# Patient Record
Sex: MALE | Race: WHITE | NOT HISPANIC OR LATINO | Employment: FULL TIME | URBAN - METROPOLITAN AREA
[De-identification: names, ages, dates, MRNs, and addresses within clinical notes are randomized per-mention and may not be internally consistent; named-entity substitution may affect disease eponyms.]

---

## 2023-12-05 ENCOUNTER — OFFICE VISIT (OUTPATIENT)
Dept: ALLERGY | Facility: CLINIC | Age: 33
End: 2023-12-05
Payer: COMMERCIAL

## 2023-12-05 VITALS
RESPIRATION RATE: 18 BRPM | DIASTOLIC BLOOD PRESSURE: 80 MMHG | HEART RATE: 66 BPM | BODY MASS INDEX: 30.34 KG/M2 | TEMPERATURE: 98 F | OXYGEN SATURATION: 99 % | WEIGHT: 230 LBS | SYSTOLIC BLOOD PRESSURE: 145 MMHG

## 2023-12-05 DIAGNOSIS — J45.30 MILD PERSISTENT ASTHMA WITHOUT COMPLICATION (HHS-HCC): Primary | ICD-10-CM

## 2023-12-05 DIAGNOSIS — J30.9 ALLERGIC RHINITIS, UNSPECIFIED SEASONALITY, UNSPECIFIED TRIGGER: ICD-10-CM

## 2023-12-05 DIAGNOSIS — L20.89 OTHER ATOPIC DERMATITIS: ICD-10-CM

## 2023-12-05 PROCEDURE — 99214 OFFICE O/P EST MOD 30 MIN: CPT | Performed by: ALLERGY & IMMUNOLOGY

## 2023-12-05 RX ORDER — MONTELUKAST SODIUM 10 MG/1
1 TABLET ORAL DAILY
COMMUNITY
Start: 2022-04-29

## 2023-12-05 RX ORDER — DUPILUMAB 300 MG/2ML
300 INJECTION, SOLUTION SUBCUTANEOUS
COMMUNITY
Start: 2020-12-02 | End: 2024-01-03 | Stop reason: SDUPTHER

## 2023-12-05 RX ORDER — FLUTICASONE PROPIONATE 50 MCG
2 SPRAY, SUSPENSION (ML) NASAL DAILY
COMMUNITY

## 2023-12-05 ASSESSMENT — ENCOUNTER SYMPTOMS
CARDIOVASCULAR NEGATIVE: 1
CONSTITUTIONAL NEGATIVE: 1
EYES NEGATIVE: 1
RESPIRATORY NEGATIVE: 1
MUSCULOSKELETAL NEGATIVE: 1
NEUROLOGICAL NEGATIVE: 1
PSYCHIATRIC NEGATIVE: 1
ENDOCRINE NEGATIVE: 1
HEMATOLOGIC/LYMPHATIC NEGATIVE: 1
GASTROINTESTINAL NEGATIVE: 1

## 2023-12-05 NOTE — PROGRESS NOTES
Subjective   Patient ID: Alcides Kilgore is a 33 y.o. male.    Chief Complaint: Follow up asthma and allergy  History of allergy skin test 2018.  Grass, ragweed, DM, Alternaria.  Cetirizine daily and and Fluticasone.  Asthma-No issues with this now. Has not needed albuterol.  History of only mild asthma.  On Singulair every day.  Atopic dermatitis-Dupixent.  Has not been able to afford as the [employer] program did not work for him because it went toward his deductible-had exhausted his [employer] benefit for the year.  Skin is mostly clear.  Small of back and hands have been trouble spots and these are controlled on Dupixent    No infections, no joint pain, no issues at shot site.  No eye issues.  [facility].  Patient would like to continue with current therapies due to excellent control and lack of side effects.    Review of Systems   Constitutional: Negative.    HENT: Negative.     Eyes: Negative.    Respiratory: Negative.     Cardiovascular: Negative.    Gastrointestinal: Negative.    Endocrine: Negative.    Genitourinary: Negative.    Musculoskeletal: Negative.    Skin:         Dry skin, mild itching   Neurological: Negative.    Hematological: Negative.    Psychiatric/Behavioral: Negative.       Objective   Physical Exam  Vitals and nursing note reviewed.   Constitutional:       Appearance: Normal appearance.   HENT:      Right Ear: Tympanic membrane normal.      Left Ear: Tympanic membrane normal.      Nose: Nose normal.      Mouth/Throat:      Mouth: Mucous membranes are moist.   Eyes:      Conjunctiva/sclera: Conjunctivae normal.      Pupils: Pupils are equal, round, and reactive to light.   Cardiovascular:      Rate and Rhythm: Normal rate and regular rhythm.      Heart sounds: Normal heart sounds.   Pulmonary:      Effort: Pulmonary effort is normal.      Breath sounds: Normal breath sounds.   Abdominal:      General: Bowel sounds are normal.      Palpations: Abdomen is soft.   Musculoskeletal:         General:  Normal range of motion.   Skin:     General: Skin is warm and dry.      Capillary Refill: Capillary refill takes less than 2 seconds.      Comments: Mild dryness and hyperlinearity on the palms.  Dry patch on the low back pale pink.  No scratch marks.   Neurological:      General: No focal deficit present.      Mental Status: He is alert and oriented to person, place, and time.   Psychiatric:         Mood and Affect: Mood normal.     Assessment/Plan    Alcides is a 32 yo man with allergy to grass, ragweed, dust mite and Alternaria mold, mild asthma, history of severe atopic dermatitis. He is controlled on current therapies without side effects. He would like to continue with this plan.  - Review of avoidance measures, medications and skin care plan.  -Continue with Dupixent for ATOPIC DERMATITIS.  -Singulair only right now for asthma is controlling symptoms.    Follow up is planned in 6 months.

## 2024-01-03 DIAGNOSIS — L20.9 ATOPIC DERMATITIS, UNSPECIFIED TYPE: Primary | ICD-10-CM

## 2024-01-03 RX ORDER — DUPILUMAB 300 MG/2ML
300 INJECTION, SOLUTION SUBCUTANEOUS
Qty: 2 ML | Refills: 11 | Status: SHIPPED | OUTPATIENT
Start: 2024-01-03

## 2024-04-04 ENCOUNTER — OFFICE VISIT (OUTPATIENT)
Dept: PRIMARY CARE | Facility: CLINIC | Age: 34
End: 2024-04-04
Payer: COMMERCIAL

## 2024-04-04 VITALS
TEMPERATURE: 98.1 F | OXYGEN SATURATION: 98 % | SYSTOLIC BLOOD PRESSURE: 137 MMHG | RESPIRATION RATE: 18 BRPM | DIASTOLIC BLOOD PRESSURE: 83 MMHG | HEART RATE: 63 BPM | BODY MASS INDEX: 29.95 KG/M2 | WEIGHT: 226 LBS | HEIGHT: 73 IN

## 2024-04-04 DIAGNOSIS — J20.9 ACUTE BRONCHITIS, UNSPECIFIED ORGANISM: Primary | ICD-10-CM

## 2024-04-04 DIAGNOSIS — J45.30 MILD PERSISTENT ASTHMA WITHOUT COMPLICATION (HHS-HCC): ICD-10-CM

## 2024-04-04 PROCEDURE — 99213 OFFICE O/P EST LOW 20 MIN: CPT | Performed by: FAMILY MEDICINE

## 2024-04-04 RX ORDER — METHYLPREDNISOLONE 4 MG/1
TABLET ORAL
Qty: 21 TABLET | Refills: 0 | Status: SHIPPED | OUTPATIENT
Start: 2024-04-04 | End: 2024-04-04 | Stop reason: SDUPTHER

## 2024-04-04 RX ORDER — METHYLPREDNISOLONE 4 MG/1
TABLET ORAL
Qty: 21 TABLET | Refills: 0 | Status: SHIPPED | OUTPATIENT
Start: 2024-04-04 | End: 2024-04-11

## 2024-04-04 RX ORDER — AZITHROMYCIN 250 MG/1
TABLET, FILM COATED ORAL
Qty: 6 TABLET | Refills: 0 | Status: SHIPPED | OUTPATIENT
Start: 2024-04-04 | End: 2024-04-09

## 2024-04-04 RX ORDER — AZITHROMYCIN 250 MG/1
TABLET, FILM COATED ORAL
Qty: 6 TABLET | Refills: 0 | Status: SHIPPED | OUTPATIENT
Start: 2024-04-04 | End: 2024-04-04 | Stop reason: SDUPTHER

## 2024-04-04 ASSESSMENT — ENCOUNTER SYMPTOMS
HEADACHES: 0
SHORTNESS OF BREATH: 0
COUGH: 1

## 2024-04-04 NOTE — PROGRESS NOTES
"Subjective     Alcides Kilgore is a 33 y.o. male who presents for Cough (Going on a month. ).    Cough  Pertinent negatives include no chest pain, headaches or shortness of breath.        Pt reports cough, mostly dry until this week. Now productive  cough present for 1 month. Cough is worsening.  No fevers.  No chills.  Sore throat.  Rhinorrhea, uses flonase.  Hx of asthma, controlled , sees allergist, on dupixent, zyrtec, singulair.  No wheezing.  No shortness of breath.  He has been using dayquil cough syrup.      Review of Systems   Respiratory:  Positive for cough. Negative for shortness of breath.    Cardiovascular:  Negative for chest pain.   Neurological:  Negative for headaches.       Objective     Vitals:    04/04/24 1557   BP: 137/83   BP Location: Left arm   Patient Position: Sitting   Pulse: 63   Resp: 18   Temp: 36.7 °C (98.1 °F)   TempSrc: Temporal   SpO2: 98%   Weight: 103 kg (226 lb)   Height: 1.854 m (6' 1\")        Current Outpatient Medications   Medication Instructions    azithromycin (Zithromax) 250 mg tablet Take 2 tablets (500 mg) by mouth once daily for 1 day, THEN 1 tablet (250 mg) once daily for 4 days. Take 2 tabs (500 mg) by mouth today, than 1 daily for 4 days..    cetirizine (ZYRTEC) 10 mg, oral, Daily    Dupixent Syringe 300 mg, subcutaneous, Every 14 days    fluticasone (Flonase Allergy Relief) 50 mcg/actuation nasal spray 2 sprays, Each Nostril, Daily    methylPREDNISolone (Medrol Dospak) 4 mg tablets Take as directed on package.    montelukast (Singulair) 10 mg tablet 1 tablet, oral, Daily        No Known Allergies     Past Surgical History:   Procedure Laterality Date    OTHER SURGICAL HISTORY  06/07/2019    Colonoscopy    OTHER SURGICAL HISTORY  03/13/2020    Endoscopy        Social History     Tobacco Use    Smoking status: Never    Smokeless tobacco: Never   Vaping Use    Vaping Use: Never used        Social History     Substance and Sexual Activity   Alcohol Use None       No " family history on file.     Immunization History   Administered Date(s) Administered    Pfizer Purple Cap SARS-CoV-2 07/30/2021, 08/20/2021        Physical Exam  Constitutional:       General: He is not in acute distress.     Appearance: He is not toxic-appearing.   HENT:      Head: Normocephalic and atraumatic.      Right Ear: Tympanic membrane, ear canal and external ear normal.      Left Ear: Tympanic membrane, ear canal and external ear normal.      Nose: No congestion or rhinorrhea.      Mouth/Throat:      Mouth: Mucous membranes are moist.      Pharynx: Oropharynx is clear. No oropharyngeal exudate or posterior oropharyngeal erythema.   Eyes:      Conjunctiva/sclera: Conjunctivae normal.   Cardiovascular:      Rate and Rhythm: Normal rate and regular rhythm.   Pulmonary:      Effort: Pulmonary effort is normal. No respiratory distress.      Breath sounds: Normal breath sounds. No wheezing, rhonchi or rales.   Lymphadenopathy:      Cervical: No cervical adenopathy.   Skin:     General: Skin is warm and dry.      Findings: No rash.   Neurological:      Mental Status: He is alert and oriented to person, place, and time. Mental status is at baseline.   Psychiatric:         Mood and Affect: Mood normal.         Behavior: Behavior normal.         Problem List Items Addressed This Visit       Mild persistent asthma without complication     Stable  On singulair, zyrtec, dupixent  Sees allergist          Other Visit Diagnoses       Acute bronchitis, unspecified organism    -  Primary    Relevant Medications    azithromycin (Zithromax) 250 mg tablet    methylPREDNISolone (Medrol Dospak) 4 mg tablets            Assessment/Plan     Acute bronchitis - treat with medrol, zpak, Follow up if no improvement or if symptoms worsen.  Proceed to the nearest emergency department if condition worsens significantly/becomes severe in nature.

## 2024-04-22 ENCOUNTER — TELEPHONE (OUTPATIENT)
Dept: PRIMARY CARE | Facility: CLINIC | Age: 34
End: 2024-04-22
Payer: COMMERCIAL

## 2024-04-22 NOTE — TELEPHONE ENCOUNTER
Per pt, he is done with the steroid and the Zpack but he is still coughing. The cough is mostly productive. He has some SOB with exertion. He is asking for advise.

## 2024-04-24 ENCOUNTER — OFFICE VISIT (OUTPATIENT)
Dept: PRIMARY CARE | Facility: CLINIC | Age: 34
End: 2024-04-24
Payer: COMMERCIAL

## 2024-04-24 VITALS
DIASTOLIC BLOOD PRESSURE: 83 MMHG | TEMPERATURE: 97.7 F | HEIGHT: 73 IN | HEART RATE: 68 BPM | OXYGEN SATURATION: 98 % | SYSTOLIC BLOOD PRESSURE: 135 MMHG | BODY MASS INDEX: 29.5 KG/M2 | RESPIRATION RATE: 18 BRPM | WEIGHT: 222.6 LBS

## 2024-04-24 DIAGNOSIS — R05.3 COUGH, PERSISTENT: Primary | ICD-10-CM

## 2024-04-24 DIAGNOSIS — J40 BRONCHITIS: ICD-10-CM

## 2024-04-24 PROCEDURE — 99213 OFFICE O/P EST LOW 20 MIN: CPT | Performed by: FAMILY MEDICINE

## 2024-04-24 RX ORDER — DOXYCYCLINE 100 MG/1
100 CAPSULE ORAL 2 TIMES DAILY
Qty: 14 CAPSULE | Refills: 0 | Status: SHIPPED | OUTPATIENT
Start: 2024-04-24 | End: 2024-05-01

## 2024-04-24 RX ORDER — PREDNISONE 50 MG/1
50 TABLET ORAL DAILY
Qty: 5 TABLET | Refills: 0 | Status: SHIPPED | OUTPATIENT
Start: 2024-04-24 | End: 2024-04-24

## 2024-04-24 RX ORDER — DOXYCYCLINE 100 MG/1
100 CAPSULE ORAL 2 TIMES DAILY
Qty: 14 CAPSULE | Refills: 0 | Status: SHIPPED | OUTPATIENT
Start: 2024-04-24 | End: 2024-04-24

## 2024-04-24 RX ORDER — BENZONATATE 200 MG/1
200 CAPSULE ORAL 3 TIMES DAILY PRN
Qty: 21 CAPSULE | Refills: 0 | Status: SHIPPED | OUTPATIENT
Start: 2024-04-24 | End: 2024-04-24

## 2024-04-24 RX ORDER — PREDNISONE 50 MG/1
50 TABLET ORAL DAILY
Qty: 5 TABLET | Refills: 0 | Status: SHIPPED | OUTPATIENT
Start: 2024-04-24 | End: 2024-04-29

## 2024-04-24 RX ORDER — OMEPRAZOLE 20 MG/1
20 TABLET, DELAYED RELEASE ORAL
COMMUNITY

## 2024-04-24 RX ORDER — BENZONATATE 200 MG/1
200 CAPSULE ORAL 3 TIMES DAILY PRN
Qty: 21 CAPSULE | Refills: 0 | Status: SHIPPED | OUTPATIENT
Start: 2024-04-24 | End: 2024-05-01

## 2024-04-24 NOTE — PROGRESS NOTES
"Subjective     Alcides Kilgore is a 33 y.o. male who presents for bronchitis follow up (Per patient, cough is still going strong, patient is unable to do any extraneous exercises. ).    HPI     Pt was seen on 4/4 for persistent cough/bronchitis - treated with medrol and zpak which helped a small amount however he continues to cough.  No fevers.  He sometimes also gets shortness of breath.  Hx of childhood asthma.  Hx of seasonal allergies. He takes zyrtec, singulair.  He has seen  allergist, last in Dec. 2023.  He started omeprazole one week ago to see if that would help with his cough which it has not.      Review of Systems   Constitutional:  Negative for fever.       Objective     Vitals:    04/24/24 1609   BP: 135/83   BP Location: Left arm   Patient Position: Sitting   Pulse: 68   Resp: 18   Temp: 36.5 °C (97.7 °F)   TempSrc: Temporal   SpO2: 98%   Weight: 101 kg (222 lb 9.6 oz)   Height: 1.854 m (6' 1\")        Current Outpatient Medications   Medication Instructions    benzonatate (TESSALON) 200 mg, oral, 3 times daily PRN, Do not crush or chew.    cetirizine (ZYRTEC) 10 mg, oral, Daily    doxycycline (VIBRAMYCIN) 100 mg, oral, 2 times daily, Take with at least 8 ounces (large glass) of water, do not lie down for 30 minutes after    Dupixent Syringe 300 mg, subcutaneous, Every 14 days    fluticasone (Flonase Allergy Relief) 50 mcg/actuation nasal spray 2 sprays, Each Nostril, Daily    montelukast (Singulair) 10 mg tablet 1 tablet, oral, Daily    omeprazole OTC (PRILOSEC OTC) 20 mg, oral, Daily before breakfast, Do not crush, chew, or split.    predniSONE (DELTASONE) 50 mg, oral, Daily        No Known Allergies     Past Surgical History:   Procedure Laterality Date    OTHER SURGICAL HISTORY  06/07/2019    Colonoscopy    OTHER SURGICAL HISTORY  03/13/2020    Endoscopy        Social History     Tobacco Use    Smoking status: Never    Smokeless tobacco: Never   Vaping Use    Vaping status: Never Used    "     Social History     Substance and Sexual Activity   Alcohol Use None       No family history on file.     Immunization History   Administered Date(s) Administered    Pfizer Purple Cap SARS-CoV-2 07/30/2021, 08/20/2021        Physical Exam  Vitals reviewed.   Constitutional:       General: He is not in acute distress.     Appearance: Normal appearance. He is well-developed.   HENT:      Head: Normocephalic and atraumatic.   Eyes:      General: Lids are normal.      Conjunctiva/sclera:      Right eye: Right conjunctiva is not injected.      Left eye: Left conjunctiva is not injected.   Cardiovascular:      Rate and Rhythm: Normal rate and regular rhythm.      Heart sounds: No murmur heard.  Pulmonary:      Effort: Pulmonary effort is normal. No respiratory distress.      Breath sounds: No wheezing, rhonchi or rales.      Comments: Slightly coarse breath sounds.   Skin:     General: Skin is warm and dry.      Findings: No rash.   Neurological:      Mental Status: He is alert and oriented to person, place, and time. Mental status is at baseline.   Psychiatric:         Mood and Affect: Mood normal.         Behavior: Behavior normal.         Problem List Items Addressed This Visit    None  Visit Diagnoses       Cough, persistent    -  Primary    Relevant Medications    benzonatate (Tessalon) 200 mg capsule    doxycycline (Vibramycin) 100 mg capsule    predniSONE (Deltasone) 50 mg tablet    Other Relevant Orders    XR chest 2 views    Bronchitis        Relevant Medications    benzonatate (Tessalon) 200 mg capsule    doxycycline (Vibramycin) 100 mg capsule    predniSONE (Deltasone) 50 mg tablet    Other Relevant Orders    XR chest 2 views            Assessment/Plan     Cough , persistent - possible asthma vs post infectious bronchitis - treat with prednisone 50 mg daily x 5 days, doxycycline 100 mg BID x 7 days, obtain CXR and follow up with allergist to get evaluated for asthma with PFTs.  Pt agreeable.  Follow up if no  improvement or if symptoms worsen.  Proceed to the nearest emergency department if condition worsens significantly/becomes severe in nature.

## 2024-04-25 ENCOUNTER — HOSPITAL ENCOUNTER (OUTPATIENT)
Dept: RADIOLOGY | Facility: HOSPITAL | Age: 34
Discharge: HOME | End: 2024-04-25
Payer: COMMERCIAL

## 2024-04-25 DIAGNOSIS — R05.3 COUGH, PERSISTENT: ICD-10-CM

## 2024-04-25 DIAGNOSIS — J40 BRONCHITIS: ICD-10-CM

## 2024-04-25 PROCEDURE — 71046 X-RAY EXAM CHEST 2 VIEWS: CPT

## 2024-04-25 PROCEDURE — 71046 X-RAY EXAM CHEST 2 VIEWS: CPT | Performed by: RADIOLOGY

## 2024-04-25 ASSESSMENT — ENCOUNTER SYMPTOMS: FEVER: 0

## 2024-05-17 ENCOUNTER — OFFICE VISIT (OUTPATIENT)
Dept: PRIMARY CARE | Facility: CLINIC | Age: 34
End: 2024-05-17
Payer: COMMERCIAL

## 2024-05-17 VITALS
RESPIRATION RATE: 18 BRPM | TEMPERATURE: 97.3 F | SYSTOLIC BLOOD PRESSURE: 128 MMHG | HEART RATE: 67 BPM | HEIGHT: 73 IN | WEIGHT: 228 LBS | DIASTOLIC BLOOD PRESSURE: 83 MMHG | OXYGEN SATURATION: 98 % | BODY MASS INDEX: 30.22 KG/M2

## 2024-05-17 DIAGNOSIS — J45.30 MILD PERSISTENT ASTHMA WITHOUT COMPLICATION (HHS-HCC): ICD-10-CM

## 2024-05-17 DIAGNOSIS — Z00.00 HEALTH CARE MAINTENANCE: Primary | ICD-10-CM

## 2024-05-17 DIAGNOSIS — J30.9 ALLERGIC RHINITIS, UNSPECIFIED SEASONALITY, UNSPECIFIED TRIGGER: ICD-10-CM

## 2024-05-17 PROBLEM — E78.1 HYPERTRIGLYCERIDEMIA: Status: ACTIVE | Noted: 2024-05-17

## 2024-05-17 LAB
POC APPEARANCE, URINE: CLEAR
POC BILIRUBIN, URINE: NEGATIVE
POC BLOOD, URINE: NEGATIVE
POC COLOR, URINE: YELLOW
POC GLUCOSE, URINE: NEGATIVE MG/DL
POC KETONES, URINE: NEGATIVE MG/DL
POC LEUKOCYTES, URINE: NEGATIVE
POC NITRITE,URINE: NEGATIVE
POC PH, URINE: 6.5 PH
POC PROTEIN, URINE: NEGATIVE MG/DL
POC SPECIFIC GRAVITY, URINE: 1.01
POC UROBILINOGEN, URINE: 0.2 EU/DL

## 2024-05-17 PROCEDURE — 90715 TDAP VACCINE 7 YRS/> IM: CPT | Performed by: FAMILY MEDICINE

## 2024-05-17 PROCEDURE — 99395 PREV VISIT EST AGE 18-39: CPT | Performed by: FAMILY MEDICINE

## 2024-05-17 PROCEDURE — 81002 URINALYSIS NONAUTO W/O SCOPE: CPT | Performed by: FAMILY MEDICINE

## 2024-05-17 PROCEDURE — 1036F TOBACCO NON-USER: CPT | Performed by: FAMILY MEDICINE

## 2024-05-17 PROCEDURE — 90471 IMMUNIZATION ADMIN: CPT | Performed by: FAMILY MEDICINE

## 2024-05-17 ASSESSMENT — PATIENT HEALTH QUESTIONNAIRE - PHQ9
1. LITTLE INTEREST OR PLEASURE IN DOING THINGS: NOT AT ALL
SUM OF ALL RESPONSES TO PHQ9 QUESTIONS 1 AND 2: 0
2. FEELING DOWN, DEPRESSED OR HOPELESS: NOT AT ALL

## 2024-05-17 ASSESSMENT — ENCOUNTER SYMPTOMS
HEADACHES: 0
SHORTNESS OF BREATH: 0

## 2024-05-17 NOTE — PROGRESS NOTES
"Subjective     Alcides Kilgore is a 33 y.o. male who presents for Annual Exam.    HPI     Pt is here today for annual well exam.  Pt feels well.     He sees allergist , on dupixent for atopic derm, hx of asthma.  He also takes singulair and zyrtec daily.      His cough has improved since his last visit with me on 4/24/24. He still has cough but will follow up with his allergist.  He reports the flonase 2 puffs daily has helped.      Review of Systems   Respiratory:  Negative for shortness of breath.    Cardiovascular:  Negative for chest pain.   Neurological:  Negative for headaches.       Objective     Vitals:    05/17/24 1000   BP: 128/83   BP Location: Left arm   Patient Position: Sitting   Pulse: 67   Resp: 18   Temp: 36.3 °C (97.3 °F)   TempSrc: Temporal   SpO2: 98%   Weight: 103 kg (228 lb)   Height: 1.854 m (6' 1\")        Current Outpatient Medications   Medication Instructions    cetirizine (ZYRTEC) 10 mg, oral, Daily    Dupixent Syringe 300 mg, subcutaneous, Every 14 days    fluticasone (Flonase Allergy Relief) 50 mcg/actuation nasal spray 2 sprays, Each Nostril, Daily    montelukast (Singulair) 10 mg tablet 1 tablet, oral, Daily    omeprazole OTC (PRILOSEC OTC) 20 mg, oral, Daily before breakfast, Do not crush, chew, or split.        No Known Allergies     Past Surgical History:   Procedure Laterality Date    OTHER SURGICAL HISTORY  06/07/2019    Colonoscopy    OTHER SURGICAL HISTORY  03/13/2020    Endoscopy        Social History     Tobacco Use    Smoking status: Never    Smokeless tobacco: Never   Vaping Use    Vaping status: Never Used        Social History     Substance and Sexual Activity   Alcohol Use None       No family history on file.     Immunization History   Administered Date(s) Administered    DTaP, Unspecified 1989, 1989, 02/05/1990, 08/07/1991, 08/22/1994    Flu vaccine (IIV4), preservative free *Check age/dose* 09/11/2018, 12/07/2021    Hepatitis B vaccine, " pediatric/adolescent (RECOMBIVAX, ENGERIX) 04/07/1998, 06/10/1998, 12/01/1998    HiB, unspecified 10/19/1990    Influenza, Unspecified 10/19/2016, 10/06/2017    Influenza, seasonal, injectable 10/01/2017    MMR vaccine, subcutaneous (MMR II) 12/27/1990, 08/16/2001    Meningococcal ACWY-D (Menactra) 4-valent conjugate vaccine 05/14/2007    PPD Test 07/05/2016, 07/13/2016, 10/18/2017, 08/08/2018    Pfizer Purple Cap SARS-CoV-2 07/30/2021, 08/20/2021    Polio, Unspecified 1989, 1989, 08/07/1991, 08/22/1994    Tdap vaccine, age 7 year and older (BOOSTRIX, ADACEL) 01/08/2015, 05/17/2024    Varicella vaccine, subcutaneous (VARIVAX) 08/16/2001        Physical Exam  Vitals reviewed.   Constitutional:       General: He is not in acute distress.     Appearance: Normal appearance.   HENT:      Head: Normocephalic and atraumatic.      Right Ear: Tympanic membrane, ear canal and external ear normal.      Left Ear: Tympanic membrane, ear canal and external ear normal.      Nose: Nose normal.      Mouth/Throat:      Mouth: Mucous membranes are moist.      Pharynx: Oropharynx is clear. No oropharyngeal exudate or posterior oropharyngeal erythema.   Eyes:      Extraocular Movements: Extraocular movements intact.      Pupils: Pupils are equal, round, and reactive to light.   Neck:      Thyroid: No thyroid mass or thyromegaly.   Cardiovascular:      Rate and Rhythm: Normal rate and regular rhythm.      Heart sounds: No murmur heard.  Pulmonary:      Effort: Pulmonary effort is normal. No respiratory distress.      Breath sounds: Normal breath sounds. No wheezing, rhonchi or rales.   Abdominal:      General: Abdomen is flat. There is no distension.      Palpations: Abdomen is soft.      Tenderness: There is no abdominal tenderness.   Genitourinary:     Testes: Normal.   Musculoskeletal:         General: Normal range of motion.   Lymphadenopathy:      Cervical: No cervical adenopathy.   Skin:     General: Skin is warm and  dry.      Findings: No rash.   Neurological:      General: No focal deficit present.      Mental Status: He is alert and oriented to person, place, and time. Mental status is at baseline.   Psychiatric:         Mood and Affect: Mood normal.         Behavior: Behavior normal.         Problem List Items Addressed This Visit       Mild persistent asthma without complication ([facility])    Allergic rhinitis     Other Visit Diagnoses       Health care maintenance    -  Primary    Relevant Orders    POCT UA (nonautomated) manually resulted (Completed)    Comprehensive Metabolic Panel    Lipid Panel    CBC and Auto Differential    Hemoglobin A1C    Tdap vaccine, age 7 years and older (Completed)            Assessment/Plan     Well Exam     Vaccines - tdap due - given today     I recommend yearly flu vaccine and routine COVID vaccinations as indicated     Complete labs    BMI 30 - Healthy diet, routine exercise was discussed with patient  - declined nutrition referral    Atopic derm, allergic rhinitis , hx of asthma - sees  allergist.     Follow up in 1 year or sooner if needed

## 2024-05-23 ENCOUNTER — LAB (OUTPATIENT)
Dept: LAB | Facility: LAB | Age: 34
End: 2024-05-23
Payer: COMMERCIAL

## 2024-05-23 DIAGNOSIS — Z00.00 HEALTH CARE MAINTENANCE: ICD-10-CM

## 2024-05-23 LAB
ALBUMIN SERPL BCP-MCNC: 5.1 G/DL (ref 3.4–5)
ALP SERPL-CCNC: 53 U/L (ref 33–120)
ALT SERPL W P-5'-P-CCNC: 41 U/L (ref 10–52)
ANION GAP SERPL CALC-SCNC: 12 MMOL/L (ref 10–20)
AST SERPL W P-5'-P-CCNC: 25 U/L (ref 9–39)
BASOPHILS # BLD AUTO: 0.04 X10*3/UL (ref 0–0.1)
BASOPHILS NFR BLD AUTO: 0.5 %
BILIRUB SERPL-MCNC: 1 MG/DL (ref 0–1.2)
BUN SERPL-MCNC: 21 MG/DL (ref 6–23)
CALCIUM SERPL-MCNC: 10 MG/DL (ref 8.6–10.3)
CHLORIDE SERPL-SCNC: 104 MMOL/L (ref 98–107)
CHOLEST SERPL-MCNC: 206 MG/DL (ref 0–199)
CHOLESTEROL/HDL RATIO: 4.5
CO2 SERPL-SCNC: 29 MMOL/L (ref 21–32)
CREAT SERPL-MCNC: 1.06 MG/DL (ref 0.5–1.3)
EGFRCR SERPLBLD CKD-EPI 2021: >90 ML/MIN/1.73M*2
EOSINOPHIL # BLD AUTO: 0.23 X10*3/UL (ref 0–0.7)
EOSINOPHIL NFR BLD AUTO: 2.9 %
ERYTHROCYTE [DISTWIDTH] IN BLOOD BY AUTOMATED COUNT: 13.2 % (ref 11.5–14.5)
EST. AVERAGE GLUCOSE BLD GHB EST-MCNC: 105 MG/DL
GLUCOSE SERPL-MCNC: 100 MG/DL (ref 74–99)
HBA1C MFR BLD: 5.3 %
HCT VFR BLD AUTO: 46.7 % (ref 41–52)
HDLC SERPL-MCNC: 45.7 MG/DL
HGB BLD-MCNC: 15.9 G/DL (ref 13.5–17.5)
IMM GRANULOCYTES # BLD AUTO: 0.02 X10*3/UL (ref 0–0.7)
IMM GRANULOCYTES NFR BLD AUTO: 0.3 % (ref 0–0.9)
LDLC SERPL CALC-MCNC: 137 MG/DL
LYMPHOCYTES # BLD AUTO: 3.23 X10*3/UL (ref 1.2–4.8)
LYMPHOCYTES NFR BLD AUTO: 40.7 %
MCH RBC QN AUTO: 30.2 PG (ref 26–34)
MCHC RBC AUTO-ENTMCNC: 34 G/DL (ref 32–36)
MCV RBC AUTO: 89 FL (ref 80–100)
MONOCYTES # BLD AUTO: 0.43 X10*3/UL (ref 0.1–1)
MONOCYTES NFR BLD AUTO: 5.4 %
NEUTROPHILS # BLD AUTO: 3.99 X10*3/UL (ref 1.2–7.7)
NEUTROPHILS NFR BLD AUTO: 50.2 %
NON HDL CHOLESTEROL: 160 MG/DL (ref 0–149)
NRBC BLD-RTO: 0 /100 WBCS (ref 0–0)
PLATELET # BLD AUTO: 218 X10*3/UL (ref 150–450)
POTASSIUM SERPL-SCNC: 4.3 MMOL/L (ref 3.5–5.3)
PROT SERPL-MCNC: 7.2 G/DL (ref 6.4–8.2)
RBC # BLD AUTO: 5.26 X10*6/UL (ref 4.5–5.9)
SODIUM SERPL-SCNC: 141 MMOL/L (ref 136–145)
TRIGL SERPL-MCNC: 117 MG/DL (ref 0–149)
VLDL: 23 MG/DL (ref 0–40)
WBC # BLD AUTO: 7.9 X10*3/UL (ref 4.4–11.3)

## 2024-05-23 PROCEDURE — 83036 HEMOGLOBIN GLYCOSYLATED A1C: CPT

## 2024-05-23 PROCEDURE — 36415 COLL VENOUS BLD VENIPUNCTURE: CPT

## 2024-05-23 PROCEDURE — 80053 COMPREHEN METABOLIC PANEL: CPT

## 2024-05-23 PROCEDURE — 80061 LIPID PANEL: CPT

## 2024-05-23 PROCEDURE — 85025 COMPLETE CBC W/AUTO DIFF WBC: CPT

## 2024-05-27 DIAGNOSIS — E78.5 HYPERLIPIDEMIA, MILD: Primary | ICD-10-CM

## 2024-06-04 ENCOUNTER — OFFICE VISIT (OUTPATIENT)
Dept: ALLERGY | Facility: CLINIC | Age: 34
End: 2024-06-04
Payer: COMMERCIAL

## 2024-06-04 VITALS
OXYGEN SATURATION: 98 % | SYSTOLIC BLOOD PRESSURE: 124 MMHG | HEIGHT: 73 IN | RESPIRATION RATE: 17 BRPM | WEIGHT: 225 LBS | TEMPERATURE: 98.7 F | BODY MASS INDEX: 29.82 KG/M2 | DIASTOLIC BLOOD PRESSURE: 80 MMHG | HEART RATE: 70 BPM

## 2024-06-04 DIAGNOSIS — J45.20 MILD INTERMITTENT ASTHMA WITHOUT COMPLICATION (HHS-HCC): ICD-10-CM

## 2024-06-04 DIAGNOSIS — L20.89 OTHER ATOPIC DERMATITIS: Primary | ICD-10-CM

## 2024-06-04 DIAGNOSIS — J30.1 SEASONAL ALLERGIC RHINITIS DUE TO POLLEN: ICD-10-CM

## 2024-06-04 PROCEDURE — 99214 OFFICE O/P EST MOD 30 MIN: CPT | Performed by: ALLERGY & IMMUNOLOGY

## 2024-06-04 NOTE — PROGRESS NOTES
Patient ID: Alcides Kilgore is a 33 y.o. male.     Chief Complaint: follow up  History Of Present Illness  Alcides Kilgore is a 33 y.o. male with PMx atopic dermatitis, allergic rhinitis, intermittent asthma presenting for follow up.         Food Allergy  No    Eczema/ Atopic Dermatitis  Dupixent-doing well. Extremely mild flare on the right hand.  Would like to stay on.  Has not had any side effects.  Self injecting at home.  [facility]  Using moisturizers and has not needed steroids    Asthma  History of mild intermittent asthma  Singulair daily  Albuterol-does not have.    Rhinoconjunctivitis  Grass, ragweed, DM and Alternaria-cetirizine and Flonase  Last month had increased symptoms and needed antibiotics and steroid    Drug Allergy   No    Insect Allergy   No    Infections  No history of frequent or recurrent infections      Review of Systems    Pertinent positives and negatives have been assessed in the HPI. All other systems have been reviewed and are negative except as noted in the HPI.    Allergies  Patient has no known allergies.    Past Medical History  He has a past medical history of Other seasonal allergic rhinitis.    Family History  No family history on file.    No history of food allergy or atopic disease in the family.    Surgical History  He has a past surgical history that includes Other surgical history (06/07/2019) and Other surgical history (03/13/2020).    Social/Environmental History  He reports that he has never smoked. He has never used smokeless tobacco. No history on file for alcohol use and drug use.    Home: Lives in a house - new purchase [facility]  Floors: Wood, carpet  Air Conditioning: Central  Smoker: No  Pets: 4 cats  Infestations: No  Molds: No  Occupation: works from home    MEDICATIONS  Current Outpatient Medications on File Prior to Visit   Medication Sig Dispense Refill    cetirizine (ZyrTEC) 2.5 MG split tablet Take 4 half tablet (10 mg) by mouth once daily.       "Dupixent Syringe 300 mg/2 mL syringe injection Inject 1 Syringe (300 mg) under the skin every 14 (fourteen) days. 2 mL 11    fluticasone (Flonase Allergy Relief) 50 mcg/actuation nasal spray Administer 2 sprays into each nostril once daily.      montelukast (Singulair) 10 mg tablet Take 1 tablet (10 mg) by mouth once daily.      omeprazole OTC (PriLOSEC OTC) 20 mg EC tablet Take 1 tablet (20 mg) by mouth once daily in the morning. Take before meals. Do not crush, chew, or split.       No current facility-administered medications on file prior to visit.         Physical Exam  Visit Vitals  /80   Pulse 70   Temp 37.1 °C (98.7 °F) (Temporal)   Resp 17   Ht 1.854 m (6' 1\")   Wt 102 kg (225 lb)   SpO2 98%   BMI 29.69 kg/m²   Smoking Status Never   BSA 2.29 m²       Wt Readings from Last 1 Encounters:   06/04/24 102 kg (225 lb)       Physical Exam    General: Well appearing, no acute distress  Head: Normocephalic, atraumatic, neck supple without lymphadenopathy  Eyes: EOMI, non-injected  Nose: No nasal crease, nares patent, slightly boggy turbinates, minimal discharge  Throat: Normal dentition, no erythema  Heart: Regular rate and rhythm  Lungs: Clear to auscultation bilaterally, effort normal  Abdomen: Soft, non-tender, normal bowel sounds  Extremities: Moves all extremities symmetrically, no edema  Skin: No rashes/lesions. Dry patch at the right thumb  Psych: normal mood and affect    LAB RESULTS:  CBC:  Recent Labs     05/23/24  0915   WBC 7.9   HGB 15.9   HCT 46.7      MCV 89   EOSABS 0.23       CMP:  Recent Labs     05/23/24  0915      K 4.3      CO2 29   ANIONGAP 12   BUN 21   CREATININE 1.06   EGFR >90     Recent Labs     05/23/24  0915   ALBUMIN 5.1*   ALKPHOS 53   ALT 41   AST 25   BILITOT 1.0         Recent Labs     05/23/24  0915   EOSABS 0.23           HEME/ENDO:  Recent Labs     05/23/24 0915   HGBA1C 5.3         Assessment/Plan   Alcides is a 34 yo man with history of severe atopic " dermatitis and doing well on Dupixent.  He has allergic rhinitis (grass, RW, DM and Alternaria) that is controlled at this time and intermittent asthma  -Airsupra on hand as needed  -allergy avoidance and medications reviewed  -continue dupixent    Radha Lira DO

## 2024-07-08 DIAGNOSIS — J45.40 MODERATE PERSISTENT ASTHMA, UNSPECIFIED WHETHER COMPLICATED (HHS-HCC): Primary | ICD-10-CM

## 2024-07-08 RX ORDER — MONTELUKAST SODIUM 10 MG/1
10 TABLET ORAL DAILY
Qty: 90 TABLET | Refills: 3 | Status: SHIPPED | OUTPATIENT
Start: 2024-07-08

## 2024-12-05 ENCOUNTER — APPOINTMENT (OUTPATIENT)
Dept: ALLERGY | Facility: CLINIC | Age: 34
End: 2024-12-05
Payer: COMMERCIAL

## 2024-12-05 ENCOUNTER — OFFICE VISIT (OUTPATIENT)
Dept: ALLERGY | Facility: CLINIC | Age: 34
End: 2024-12-05
Payer: COMMERCIAL

## 2024-12-05 VITALS
SYSTOLIC BLOOD PRESSURE: 128 MMHG | HEIGHT: 73 IN | RESPIRATION RATE: 18 BRPM | TEMPERATURE: 98.2 F | HEART RATE: 59 BPM | WEIGHT: 218 LBS | DIASTOLIC BLOOD PRESSURE: 80 MMHG | OXYGEN SATURATION: 98 % | BODY MASS INDEX: 28.89 KG/M2

## 2024-12-05 DIAGNOSIS — J30.9 ALLERGIC RHINITIS, UNSPECIFIED SEASONALITY, UNSPECIFIED TRIGGER: ICD-10-CM

## 2024-12-05 DIAGNOSIS — L20.89 OTHER ATOPIC DERMATITIS: ICD-10-CM

## 2024-12-05 DIAGNOSIS — J45.20 MILD INTERMITTENT ASTHMA, UNSPECIFIED WHETHER COMPLICATED (HHS-HCC): Primary | ICD-10-CM

## 2024-12-05 PROCEDURE — 3008F BODY MASS INDEX DOCD: CPT | Performed by: ALLERGY & IMMUNOLOGY

## 2024-12-05 PROCEDURE — 99214 OFFICE O/P EST MOD 30 MIN: CPT | Performed by: ALLERGY & IMMUNOLOGY

## 2024-12-05 PROCEDURE — 96160 PT-FOCUSED HLTH RISK ASSMT: CPT | Performed by: ALLERGY & IMMUNOLOGY

## 2024-12-05 NOTE — PROGRESS NOTES
Patient ID: Alcides Kilgore is a 34 y.o. male.     Chief Complaint: follow up, last seen June 2024  History Of Present Illness  Alcides Kilgore is a 34 y.o. male with PMx allergic rhinitis, atopic dermatis and intermittent asthma presenting for follow up.   Moved to a new home.  Well water at this new location.  Got a softener that has helped.    Food Allergy  No    Eczema/ Atopic Dermatitis  Dupixent-doing well. Right thigh and left are-itchy eczema patches.  Not using topical steroid.  Has not had any side effects.  Self injecting at home.  [facility]  Using moisturizers cetaphil, udderly smooth.  In a month will have new insurance.    Asthma  Singulair daily    Rhinoconjunctivitis  Grass, ragweed, DM and Alternaria-cetirizine and Flonase     Drug Allergy   No    Insect Allergy   No    Infections  No history of frequent or recurrent infections      Review of Systems    Pertinent positives and negatives have been assessed in the HPI. All other systems have been reviewed and are negative except as noted in the HPI.    Allergies  Patient has no known allergies.    Past Medical History  He has a past medical history of Other seasonal allergic rhinitis.    Family History  No family history on file.      Surgical History  He has a past surgical history that includes Other surgical history (06/07/2019) and Other surgical history (03/13/2020).    Social/Environmental History  He reports that he has never smoked. He has never used smokeless tobacco. No history on file for alcohol use and drug use.    Home: Lives in a house   Floors: mixed  Air Conditioning: Central  Smoker: No  Pets: 4 cats  Infestations: No  Molds: No  Occupation: works from home    MEDICATIONS  Current Outpatient Medications on File Prior to Visit   Medication Sig Dispense Refill    cetirizine (ZyrTEC) 2.5 MG split tablet Take 4 half tablet (10 mg) by mouth once daily.      Dupixent Syringe 300 mg/2 mL syringe injection Inject 1 Syringe (300 mg)  "under the skin every 14 (fourteen) days. 2 mL 11    fluticasone (Flonase Allergy Relief) 50 mcg/actuation nasal spray Administer 2 sprays into each nostril once daily.      montelukast (Singulair) 10 mg tablet Take 1 tablet (10 mg) by mouth once daily. 90 tablet 3    omeprazole OTC (PriLOSEC OTC) 20 mg EC tablet Take 1 tablet (20 mg) by mouth once daily in the morning. Take before meals. Do not crush, chew, or split.       No current facility-administered medications on file prior to visit.         Physical Exam  Visit Vitals  /80   Pulse 59   Temp 36.8 °C (98.2 °F) (Temporal)   Resp 18   Ht 1.854 m (6' 1\")   Wt 98.9 kg (218 lb)   SpO2 98%   BMI 28.76 kg/m²   Smoking Status Never   BSA 2.26 m²       Wt Readings from Last 1 Encounters:   12/05/24 98.9 kg (218 lb)       Physical Exam    General: Well appearing, no acute distress  Head: Normocephalic, atraumatic, neck supple without lymphadenopathy  Eyes: EOMI, non-injected  Ears: TM's nml  Nose: No nasal crease, nares patent, slightly boggy turbinates, mucoid discharge  Throat: Normal dentition, no erythema.  Heart: Regular rate and rhythm  Lungs: Clear to auscultation bilaterally, effort normal  Abdomen: Soft, non-tender, normal bowel sounds  Extremities: Moves all extremities symmetrically, no edema  Skin: Rash noted: Left inner upper arm is flat, red and soft.  Right top of thigh is erythematous patchy red irritation.  Psych: normal mood and affect    LAB RESULTS:  CBC:  Recent Labs     05/23/24  0915   WBC 7.9   HGB 15.9   HCT 46.7      MCV 89   EOSABS 0.23       CMP:  Recent Labs     05/23/24  0915      K 4.3      CO2 29   ANIONGAP 12   BUN 21   CREATININE 1.06   EGFR >90     Recent Labs     05/23/24  0915   ALBUMIN 5.1*   ALKPHOS 53   ALT 41   AST 25   BILITOT 1.0         Recent Labs     05/23/24  0915   EOSABS 0.23       HEME/ENDO:  Recent Labs     05/23/24  0915   HGBA1C 5.3     ACT 23    Assessment/Plan   Alcides is a 35 yo man with " allergic rhinitis to pollen, dust mite, alternaria, severe atopic dermatitis and intermittent asthma.  He is doing well with allergy avoidance and medications. He does need a new rescue inhaler. He lost his when he moved. I have provided a sample of Airsupra to try.  He is having a couple of breakthrough rashes. These may be associated with sweating/tighter clothing while exercising.  He will change what he wears during work outs and treat with a trial of Eucrisa (samples provided). He will follow up if this is not effective.  Continue with current medications and follow up in 6 months.    Radha Danielle [place], DO

## 2024-12-16 DIAGNOSIS — L20.9 ATOPIC DERMATITIS, UNSPECIFIED TYPE: ICD-10-CM

## 2024-12-17 RX ORDER — DUPILUMAB 300 MG/2ML
1 INJECTION, SOLUTION SUBCUTANEOUS
Qty: 4 ML | Refills: 11 | Status: SHIPPED | OUTPATIENT
Start: 2024-12-17

## 2025-05-19 ENCOUNTER — APPOINTMENT (OUTPATIENT)
Dept: PRIMARY CARE | Facility: CLINIC | Age: 35
End: 2025-05-19
Payer: COMMERCIAL

## 2025-05-19 VITALS
HEART RATE: 65 BPM | HEIGHT: 73 IN | BODY MASS INDEX: 29.69 KG/M2 | DIASTOLIC BLOOD PRESSURE: 80 MMHG | TEMPERATURE: 97.9 F | SYSTOLIC BLOOD PRESSURE: 132 MMHG | RESPIRATION RATE: 18 BRPM | WEIGHT: 224 LBS | OXYGEN SATURATION: 99 %

## 2025-05-19 DIAGNOSIS — R73.9 HYPERGLYCEMIA: ICD-10-CM

## 2025-05-19 DIAGNOSIS — J45.30 MILD PERSISTENT ASTHMA, UNCOMPLICATED (HHS-HCC): ICD-10-CM

## 2025-05-19 DIAGNOSIS — B36.0 TINEA VERSICOLOR: ICD-10-CM

## 2025-05-19 DIAGNOSIS — Z86.0100 HISTORY OF COLON POLYPS: ICD-10-CM

## 2025-05-19 DIAGNOSIS — Z00.00 HEALTHCARE MAINTENANCE: Primary | ICD-10-CM

## 2025-05-19 DIAGNOSIS — Z13.220 LIPID SCREENING: ICD-10-CM

## 2025-05-19 PROCEDURE — 3008F BODY MASS INDEX DOCD: CPT | Performed by: FAMILY MEDICINE

## 2025-05-19 PROCEDURE — 1036F TOBACCO NON-USER: CPT | Performed by: FAMILY MEDICINE

## 2025-05-19 PROCEDURE — 99395 PREV VISIT EST AGE 18-39: CPT | Performed by: FAMILY MEDICINE

## 2025-05-19 PROCEDURE — 81002 URINALYSIS NONAUTO W/O SCOPE: CPT | Performed by: FAMILY MEDICINE

## 2025-05-19 RX ORDER — KETOCONAZOLE 20 MG/ML
SHAMPOO, SUSPENSION TOPICAL
Qty: 120 ML | Refills: 0 | Status: SHIPPED | OUTPATIENT
Start: 2025-05-19

## 2025-05-19 ASSESSMENT — PATIENT HEALTH QUESTIONNAIRE - PHQ9
SUM OF ALL RESPONSES TO PHQ9 QUESTIONS 1 AND 2: 0
2. FEELING DOWN, DEPRESSED OR HOPELESS: NOT AT ALL
1. LITTLE INTEREST OR PLEASURE IN DOING THINGS: NOT AT ALL

## 2025-05-19 ASSESSMENT — ENCOUNTER SYMPTOMS
HEADACHES: 0
SHORTNESS OF BREATH: 0

## 2025-05-19 NOTE — PROGRESS NOTES
"Subjective     Alcides Kilgore is a 34 y.o. male who presents for Annual Exam.    HPI     Pt is here today for annual well exam.     Pt reports circular salmon colored skin rash on abdomen, back, inguinal region which started three weeks ago.  Mildly itchy.  No pain.      PMx allergic rhinitis, atopic dermatis and intermittent asthma - pt sees  allergist. Pt is on Dupixent for atopic dermatitis.      1st boy born [birth year].  [place]     Physical activity - cycling, callisthenics     Diet - balanced.      Review of Systems   Respiratory:  Negative for shortness of breath.    Cardiovascular:  Negative for chest pain.   Neurological:  Negative for headaches.       Objective     Vitals:    05/19/25 0947 05/19/25 1018   BP: 130/86 132/80   BP Location: Right arm    Patient Position: Sitting    Pulse: 65    Resp: 18    Temp: 36.6 °C (97.9 °F)    TempSrc: Temporal    SpO2: 99%    Weight: 102 kg (224 lb)    Height: 1.854 m (6' 1\")         Current Outpatient Medications   Medication Instructions    cetirizine (ZYRTEC) 10 mg, Daily    Dupixent Pen 300 mg, subcutaneous, Every 14 days    fluticasone (Flonase Allergy Relief) 50 mcg/actuation nasal spray 2 sprays, Daily    ketoconazole (NIZOral) 2 % shampoo Apply to area (neck down) , leave on for 10 minutes then wash off in shower.  Do this daily for two weeks.    montelukast (SINGULAIR) 10 mg, oral, Daily    omeprazole OTC (PRILOSEC OTC) 20 mg, Daily before breakfast        RX Allergies[1]     Surgical History[2]     Social History[3]     Family History[4]     Immunization History   Administered Date(s) Administered    COVID-19, mRNA, LNP-S, PF, 30 mcg/0.3 mL dose 07/30/2021, 08/20/2021    DTaP, Unspecified 1989, 1989, 02/05/1990, 08/07/1991, 08/22/1994    Flu vaccine (IIV4), preservative free *Check age/dose* 09/11/2018, 12/07/2021    Hepatitis B vaccine, 19 yrs and under (RECOMBIVAX, ENGERIX) 04/07/1998, 06/10/1998, 12/01/1998    HiB, unspecified 10/19/1990    " "Influenza, Unspecified 10/19/2016, 10/06/2017    Influenza, seasonal, injectable 10/19/2016, 10/01/2017    MMR vaccine, subcutaneous (MMR II) 12/27/1990, 08/16/2001    Meningococcal ACWY-D (Menactra) 4-valent conjugate vaccine 05/14/2007    PPD Test 07/05/2016, 07/13/2016, 10/18/2017, 08/08/2018    Polio, Unspecified 1989, 1989, 08/07/1991, 08/22/1994    Tdap vaccine, age 7 year and older (BOOSTRIX, ADACEL) 01/08/2015, 05/17/2024    Varicella vaccine, subcutaneous (VARIVAX) 08/16/2001        Lab Results   Component Value Date    WBC 7.9 05/23/2024    RBC 5.26 05/23/2024    HGB 15.9 05/23/2024    HCT 46.7 05/23/2024    MCV 89 05/23/2024    MCH 30.2 05/23/2024    MCHC 34.0 05/23/2024     05/23/2024     Lab Results   Component Value Date    GLUCOSE 100 (H) 05/23/2024     05/23/2024    K 4.3 05/23/2024     05/23/2024    CO2 29 05/23/2024    ANIONGAP 12 05/23/2024    BUN 21 05/23/2024    CREATININE 1.06 05/23/2024    CALCIUM 10.0 05/23/2024    ALBUMIN 5.1 (H) 05/23/2024    ALKPHOS 53 05/23/2024    PROT 7.2 05/23/2024    AST 25 05/23/2024    BILITOT 1.0 05/23/2024    ALT 41 05/23/2024      Lab Results   Component Value Date    CHOL 206 (H) 05/23/2024    HDL 45.7 05/23/2024    CHHDL 4.5 05/23/2024    LDLCALC 137 (H) 05/23/2024    VLDL 23 05/23/2024    TRIG 117 05/23/2024      No results found for: \"TSH\"   No results found for: \"VITD25\"   Lab Results   Component Value Date    HGBA1C 5.3 05/23/2024    XLFSEJBE2V 105 05/23/2024       Physical Exam  Vitals reviewed.   Constitutional:       General: He is not in acute distress.     Appearance: Normal appearance. He is not ill-appearing.   HENT:      Head: Normocephalic and atraumatic.      Right Ear: Tympanic membrane, ear canal and external ear normal.      Left Ear: Tympanic membrane, ear canal and external ear normal.      Mouth/Throat:      Mouth: Mucous membranes are moist.      Pharynx: No oropharyngeal exudate or posterior oropharyngeal " erythema.   Neck:      Thyroid: No thyroid mass or thyromegaly.   Cardiovascular:      Rate and Rhythm: Normal rate and regular rhythm.      Heart sounds: No murmur heard.  Pulmonary:      Effort: No respiratory distress.      Breath sounds: Normal breath sounds. No wheezing, rhonchi or rales.   Abdominal:      General: There is no distension.      Palpations: Abdomen is soft.      Tenderness: There is no abdominal tenderness.   Lymphadenopathy:      Cervical: No cervical adenopathy.   Skin:     General: Skin is warm and dry.      Findings: Rash (salmon colored rash on trunk) present.   Neurological:      Mental Status: He is alert and oriented to person, place, and time. Mental status is at baseline.   Psychiatric:         Mood and Affect: Mood normal.         Behavior: Behavior normal.         Assessment & Plan  Healthcare maintenance  Well Exam     Vaccines - tdap utd     I recommend yearly flu vaccine and routine COVID vaccinations as indicated     Complete labs    Healthy diet, routine exercise was discussed with patient      Orders:    POCT UA (nonautomated) manually resulted    CBC and Auto Differential; Future    Comprehensive Metabolic Panel; Future    Tinea versicolor  Treat with ketoconazole shampoo.   Orders:    ketoconazole (NIZOral) 2 % shampoo; Apply to area (neck down) , leave on for 10 minutes then wash off in shower.  Do this daily for two weeks.    History of colon polyps  Polyp removed in 2010.  Will see if pt is due again for colonoscopy, referral to GI made.   Orders:    Referral to Gastroenterology; Future    Lipid screening    Orders:    Lipid Panel; Future    Hyperglycemia    Orders:    Hemoglobin A1C; Future    Mild persistent asthma, uncomplicated ([facility]-HCC)  Stable                            [1] No Known Allergies  [2]   Past Surgical History:  Procedure Laterality Date    OTHER SURGICAL HISTORY  06/07/2019    Colonoscopy    OTHER SURGICAL HISTORY  03/13/2020    Endoscopy   [3]   Social  History  Tobacco Use    Smoking status: Never    Smokeless tobacco: Never   Vaping Use    Vaping status: Never Used   [4] No family history on file.

## 2025-05-20 LAB
ALBUMIN SERPL-MCNC: 5.3 G/DL (ref 3.6–5.1)
ALP SERPL-CCNC: 48 U/L (ref 36–130)
ALT SERPL-CCNC: 42 U/L (ref 9–46)
ANION GAP SERPL CALCULATED.4IONS-SCNC: 9 MMOL/L (CALC) (ref 7–17)
AST SERPL-CCNC: 27 U/L (ref 10–40)
BASOPHILS # BLD AUTO: 51 CELLS/UL (ref 0–200)
BASOPHILS NFR BLD AUTO: 0.6 %
BILIRUB SERPL-MCNC: 1.1 MG/DL (ref 0.2–1.2)
BUN SERPL-MCNC: 19 MG/DL (ref 7–25)
CALCIUM SERPL-MCNC: 10 MG/DL (ref 8.6–10.3)
CHLORIDE SERPL-SCNC: 104 MMOL/L (ref 98–110)
CHOLEST SERPL-MCNC: 201 MG/DL
CHOLEST/HDLC SERPL: 3.8 (CALC)
CO2 SERPL-SCNC: 28 MMOL/L (ref 20–32)
CREAT SERPL-MCNC: 0.97 MG/DL (ref 0.6–1.26)
EGFRCR SERPLBLD CKD-EPI 2021: 104 ML/MIN/1.73M2
EOSINOPHIL # BLD AUTO: 153 CELLS/UL (ref 15–500)
EOSINOPHIL NFR BLD AUTO: 1.8 %
ERYTHROCYTE [DISTWIDTH] IN BLOOD BY AUTOMATED COUNT: 12.6 % (ref 11–15)
EST. AVERAGE GLUCOSE BLD GHB EST-MCNC: 108 MG/DL
EST. AVERAGE GLUCOSE BLD GHB EST-SCNC: 6 MMOL/L
GLUCOSE SERPL-MCNC: 96 MG/DL (ref 65–99)
HBA1C MFR BLD: 5.4 %
HCT VFR BLD AUTO: 48.4 % (ref 38.5–50)
HDLC SERPL-MCNC: 53 MG/DL
HGB BLD-MCNC: 16.6 G/DL (ref 13.2–17.1)
LDLC SERPL CALC-MCNC: 131 MG/DL (CALC)
LYMPHOCYTES # BLD AUTO: 2984 CELLS/UL (ref 850–3900)
LYMPHOCYTES NFR BLD AUTO: 35.1 %
MCH RBC QN AUTO: 30.8 PG (ref 27–33)
MCHC RBC AUTO-ENTMCNC: 34.3 G/DL (ref 32–36)
MCV RBC AUTO: 89.8 FL (ref 80–100)
MONOCYTES # BLD AUTO: 400 CELLS/UL (ref 200–950)
MONOCYTES NFR BLD AUTO: 4.7 %
NEUTROPHILS # BLD AUTO: 4913 CELLS/UL (ref 1500–7800)
NEUTROPHILS NFR BLD AUTO: 57.8 %
NONHDLC SERPL-MCNC: 148 MG/DL (CALC)
PLATELET # BLD AUTO: 221 THOUSAND/UL (ref 140–400)
PMV BLD REES-ECKER: 10.2 FL (ref 7.5–12.5)
POTASSIUM SERPL-SCNC: 4.5 MMOL/L (ref 3.5–5.3)
PROT SERPL-MCNC: 7.4 G/DL (ref 6.1–8.1)
RBC # BLD AUTO: 5.39 MILLION/UL (ref 4.2–5.8)
SODIUM SERPL-SCNC: 141 MMOL/L (ref 135–146)
TRIGL SERPL-MCNC: 75 MG/DL
WBC # BLD AUTO: 8.5 THOUSAND/UL (ref 3.8–10.8)

## 2025-06-05 ENCOUNTER — APPOINTMENT (OUTPATIENT)
Dept: ALLERGY | Facility: CLINIC | Age: 35
End: 2025-06-05
Payer: COMMERCIAL

## 2025-06-05 VITALS
SYSTOLIC BLOOD PRESSURE: 118 MMHG | RESPIRATION RATE: 17 BRPM | TEMPERATURE: 98.1 F | HEART RATE: 69 BPM | WEIGHT: 222 LBS | HEIGHT: 73 IN | OXYGEN SATURATION: 98 % | BODY MASS INDEX: 29.42 KG/M2 | DIASTOLIC BLOOD PRESSURE: 80 MMHG

## 2025-06-05 DIAGNOSIS — L20.89 OTHER ATOPIC DERMATITIS: Primary | ICD-10-CM

## 2025-06-05 DIAGNOSIS — J45.40 MODERATE PERSISTENT ASTHMA, UNSPECIFIED WHETHER COMPLICATED (HHS-HCC): ICD-10-CM

## 2025-06-05 RX ORDER — ALBUTEROL SULFATE AND BUDESONIDE 90; 80 UG/1; UG/1
2 AEROSOL, METERED RESPIRATORY (INHALATION) EVERY 6 HOURS PRN
COMMUNITY

## 2025-06-05 RX ORDER — CRISABOROLE 20 MG/G
1 OINTMENT TOPICAL 2 TIMES DAILY
Qty: 60 G | Refills: 1 | Status: SHIPPED | OUTPATIENT
Start: 2025-06-05 | End: 2025-07-05

## 2025-06-05 RX ORDER — CRISABOROLE 20 MG/G
1 OINTMENT TOPICAL 2 TIMES DAILY
Qty: 60 G | Refills: 1 | Status: SHIPPED | OUTPATIENT
Start: 2025-06-05 | End: 2025-06-05 | Stop reason: SDUPTHER

## 2025-06-05 RX ORDER — MONTELUKAST SODIUM 10 MG/1
10 TABLET ORAL DAILY
Qty: 90 TABLET | Refills: 3 | Status: SHIPPED | OUTPATIENT
Start: 2025-06-05

## 2025-06-05 NOTE — PATIENT INSTRUCTIONS
Eucrisa sent to local pharmacy  Singulair sent to [facility] delivery  Continue Dupixent-call if any issues with RX  Follow up in 6 months

## 2025-06-05 NOTE — PROGRESS NOTES
Patient ID: Alcides Kilgore is a 34 y.o. male.     Chief Complaint: follow up, last seen 12/5/24  History Of Present Illness  Alcides Kilgore is a 34 y.o. male with PMx allergic rhinitis, atopic dermatis and intermittent asthma presenting for follow up.   Moved to a new home.  Well water at this new location.  Got a softener that has helped.-feels this issue is improved      Food Allergy  No    Eczema/ Atopic Dermatitis  Dupixent-doing well. Right side/flank-being treated for tinea that is improving and feels itchy at that site  Has not had any side effects for Dupixent.  Self injecting at home.  [facility]  Using moisturizers cetaphil, udderly smooth.  In a month will have new insurance.    Asthma  Singulair daily    Rhinoconjunctivitis  Grass, ragweed, DM and Alternaria-cetirizine and Flonase     Drug Allergy   No    Insect Allergy   No    Infections  No history of frequent or recurrent infections      Review of Systems    Pertinent positives and negatives have been assessed in the HPI. All other systems have been reviewed and are negative except as noted in the HPI.    Allergies  Patient has no known allergies.    Past Medical History  He has a past medical history of Other seasonal allergic rhinitis.    Family History  No family history on file.      Surgical History  He has a past surgical history that includes Other surgical history (06/07/2019) and Other surgical history (03/13/2020).    Social/Environmental History  He reports that he has never smoked. He has never used smokeless tobacco. No history on file for alcohol use and drug use.    Home: Lives in a house   Floors: mixed  Air Conditioning: Central  Smoker: No  Pets: 4 cats  Infestations: No  Molds: No  Occupation: works from home  [employer]    MEDICATIONS  Current Outpatient Medications on File Prior to Visit   Medication Sig Dispense Refill    albuterol-budesonide (Airsupra) 90-80 mcg/actuation inhaler Inhale 2 puffs every 6 hours if  "needed.      cetirizine (ZyrTEC) 2.5 MG split tablet Take 4 half tablet (10 mg) by mouth once daily.      Dupixent Pen 300 mg/2 mL pen injector INJECT 1 PEN UNDER THE SKIN EVERY 14 DAYS 4 mL 11    fluticasone (Flonase Allergy Relief) 50 mcg/actuation nasal spray Administer 2 sprays into each nostril once daily.      ketoconazole (NIZOral) 2 % shampoo Apply to area (neck down) , leave on for 10 minutes then wash off in shower.  Do this daily for two weeks. 120 mL 0    montelukast (Singulair) 10 mg tablet Take 1 tablet (10 mg) by mouth once daily. 90 tablet 3    omeprazole OTC (PriLOSEC OTC) 20 mg EC tablet Take 1 tablet (20 mg) by mouth once daily in the morning. Take before meals. Do not crush, chew, or split.       No current facility-administered medications on file prior to visit.         Physical Exam  Visit Vitals  Smoking Status Never       Wt Readings from Last 1 Encounters:   05/19/25 102 kg (224 lb)   /80   Pulse 69   Temp 36.7 °C (98.1 °F) (Temporal)   Resp 17   Ht 1.854 m (6' 1\")   Wt 101 kg (222 lb)   SpO2 98%   BMI 29.29 kg/m²       Physical Exam    General: Well appearing, no acute distress  Head: Normocephalic, atraumatic, neck supple without lymphadenopathy  Eyes: EOMI, non-injected  Ears: TM's nml  Nose: No nasal crease, nares patent, slightly boggy turbinates, mucoid discharge  Throat: Normal dentition, no erythema.  Heart: Regular rate and rhythm  Lungs: Clear to auscultation bilaterally, effort normal  Abdomen: Soft, non-tender, normal bowel sounds  Extremities: Moves all extremities symmetrically, no edema  Skin: Rash noted: right flank and upper right hip, salmon-colored slightly raised.  Psych: normal mood and affect    LAB RESULTS:  CBC:  Recent Labs     05/19/25  1028 05/23/24  0915   WBC 8.5 7.9   HGB 16.6 15.9   HCT 48.4 46.7    218   MCV 89.8 89   EOSABS 153 0.23       CMP:  Recent Labs     05/19/25  1028 05/23/24  0915    141   K 4.5 4.3    104   CO2 28 29 "   ANIONGAP 9 12   BUN 19 21   CREATININE 0.97 1.06   EGFR 104 >90     Recent Labs     05/19/25  1028 05/23/24  0915   ALBUMIN 5.3* 5.1*   ALKPHOS 48 53   ALT 42 41   AST 27 25   BILITOT 1.1 1.0         Recent Labs     05/19/25  1028 05/23/24  0915   EOSABS 153 0.23       HEME/ENDO:  Recent Labs     05/19/25  1028 05/23/24  0915   HGBA1C 5.4 5.3     Spirometry is normal 6/5/25    Assessment/Plan   Alcides is a 33 yo man with allergic rhinitis to pollen, dust mite, alternaria, severe atopic dermatitis and intermittent asthma.  He is doing well with allergy avoidance and medications.   -air supra as needed. Spirometry is normal.  - He is having a couple of breakthrough rashes, on anti fungal.  -Recommend Eucrisa, moisturizer, antihistamines and continued Dupixent  Continue with current medications and follow up in 6 months.    Radha Lira, DO

## 2025-09-29 ENCOUNTER — APPOINTMENT (OUTPATIENT)
Dept: GASTROENTEROLOGY | Facility: CLINIC | Age: 35
End: 2025-09-29
Payer: COMMERCIAL

## 2025-12-04 ENCOUNTER — APPOINTMENT (OUTPATIENT)
Dept: ALLERGY | Facility: CLINIC | Age: 35
End: 2025-12-04
Payer: COMMERCIAL